# Patient Record
Sex: MALE | Race: OTHER | HISPANIC OR LATINO | URBAN - METROPOLITAN AREA
[De-identification: names, ages, dates, MRNs, and addresses within clinical notes are randomized per-mention and may not be internally consistent; named-entity substitution may affect disease eponyms.]

---

## 2019-05-18 ENCOUNTER — EMERGENCY (EMERGENCY)
Facility: HOSPITAL | Age: 74
LOS: 1 days | Discharge: HOME | End: 2019-05-18
Attending: EMERGENCY MEDICINE
Payer: SELF-PAY

## 2019-05-18 VITALS
SYSTOLIC BLOOD PRESSURE: 121 MMHG | DIASTOLIC BLOOD PRESSURE: 67 MMHG | HEIGHT: 60.24 IN | RESPIRATION RATE: 18 BRPM | HEART RATE: 60 BPM | TEMPERATURE: 98 F | OXYGEN SATURATION: 100 % | WEIGHT: 132.28 LBS

## 2019-05-18 VITALS
DIASTOLIC BLOOD PRESSURE: 65 MMHG | TEMPERATURE: 98 F | HEART RATE: 64 BPM | RESPIRATION RATE: 16 BRPM | SYSTOLIC BLOOD PRESSURE: 126 MMHG | OXYGEN SATURATION: 98 %

## 2019-05-18 PROCEDURE — 99283 EMERGENCY DEPT VISIT LOW MDM: CPT

## 2019-05-18 RX ORDER — DIPHENHYDRAMINE HCL 50 MG
25 CAPSULE ORAL ONCE
Refills: 0 | Status: COMPLETED | OUTPATIENT
Start: 2019-05-18 | End: 2019-05-18

## 2019-05-18 RX ADMIN — Medication 25 MILLIGRAM(S): at 10:52

## 2019-05-18 RX ADMIN — Medication 5 MILLIGRAM(S): at 10:52

## 2019-05-18 NOTE — ED PROVIDER NOTE - CHPI ED SYMPTOMS NEG
no bruising/no scaly patches on skin/no vomiting/no inflammation/no pain/no petechia/no chills/no fever

## 2019-05-18 NOTE — ED PROVIDER NOTE - CLINICAL SUMMARY MEDICAL DECISION MAKING FREE TEXT BOX
pt with most likely allergic reaction   will give benadryl and prednisone  pt did not want to wait to sign dc papers buts given verbal instrucitons and will follow up with pmd this week and return if any new or concerning symptoms

## 2019-05-18 NOTE — ED PROVIDER NOTE - OBJECTIVE STATEMENT
pt only speaks Greek  declined  phone  used family to interpret   pt with itchy rash for two days  used a new lotion and was in the sun  no sick contacts  just returned from [place]  no fever no cough no chills

## 2019-05-18 NOTE — ED ADULT NURSE NOTE - NSIMPLEMENTINTERV_GEN_ALL_ED
Implemented All Universal Safety Interventions:  [place] to call system. Call bell, personal items and telephone within reach. Instruct patient to call for assistance. Room bathroom lighting operational. Non-slip footwear when patient is off stretcher. Physically safe environment: no spills, clutter or unnecessary equipment. Stretcher in lowest position, wheels locked, appropriate side rails in place.

## 2019-05-22 ENCOUNTER — EMERGENCY (EMERGENCY)
Facility: HOSPITAL | Age: 74
LOS: 1 days | Discharge: HOME | End: 2019-05-22
Attending: EMERGENCY MEDICINE
Payer: SELF-PAY

## 2019-05-22 VITALS
RESPIRATION RATE: 16 BRPM | SYSTOLIC BLOOD PRESSURE: 163 MMHG | OXYGEN SATURATION: 98 % | TEMPERATURE: 98 F | WEIGHT: 134.92 LBS | HEART RATE: 63 BPM | DIASTOLIC BLOOD PRESSURE: 74 MMHG | HEIGHT: 62 IN

## 2019-05-22 PROBLEM — I10 ESSENTIAL (PRIMARY) HYPERTENSION: Chronic | Status: ACTIVE | Noted: 2019-05-18

## 2019-05-22 PROCEDURE — 99283 EMERGENCY DEPT VISIT LOW MDM: CPT

## 2019-05-22 RX ORDER — DIPHENHYDRAMINE HCL 50 MG
1 CAPSULE ORAL
Qty: 16 | Refills: 0
Start: 2019-05-22 | End: 2019-05-25

## 2019-05-22 RX ORDER — PERMETHRIN CREAM 5% W/W 50 MG/G
1 CREAM TOPICAL
Qty: 60 | Refills: 0
Start: 2019-05-22

## 2019-05-22 RX ORDER — DIPHENHYDRAMINE HCL 50 MG
25 CAPSULE ORAL ONCE
Refills: 0 | Status: COMPLETED | OUTPATIENT
Start: 2019-05-22 | End: 2019-05-22

## 2019-05-22 RX ADMIN — Medication 25 MILLIGRAM(S): at 11:47

## 2019-05-22 NOTE — ED ADULT NURSE NOTE - NSIMPLEMENTINTERV_GEN_ALL_ED
Implemented All Universal Safety Interventions:  [place] to call system. Call bell, personal items and telephone within reach. Instruct patient to call for assistance. Room bathroom lighting operational. Non-slip footwear when patient is off stretcher. Physically safe environment: no spills, clutter or unnecessary equipment. Stretcher in lowest position, wheels locked, appropriate side rails in place.

## 2019-05-22 NOTE — ED PROVIDER NOTE - OBJECTIVE STATEMENT
74 y/o M with PMHx of HTN presents to the ED with complaints of itchiness to the body. Patient was seen in the ED 4 days ago at which time was given Benadryl with improvement of symptoms. Patient states rash and itchiness is worsened at night. Patient son notes patient's wife passed away one month ago and is concerned for anxiety. Denies fever, pain or any other acute complaints. Patient is visiting from [place]. Refused , prefers for son to translate: 74 y/o M with PMHx of HTN presents to the ED with complaints of itchiness to the body. Patient was seen in the ED 4 days ago at which time was given Benadryl with improvement of symptoms. Patient states rash and itchiness is worsened at night. Patient son notes patient's wife passed away one month ago and is concerned for anxiety. Denies fever, pain or any other acute complaints. Patient is visiting from [place].

## 2019-05-22 NOTE — ED PROVIDER NOTE - PROGRESS NOTE DETAILS
Lower suspicion of scabies however symptoms much worse at night. Will give trial of Permethrin. Will also  give Benadryl and allergist follow up. Pt is well appearing walking with steady gait, stable for discharge and follow up without fail with medical doctor. I had a detailed discussion with the patient and/or guardian regarding the historical points, exam findings, and any diagnostic results supporting the discharge diagnosis. Pt educated on care and need for follow up. Strict return instructions and red flag signs and symptoms discussed with patient. Questions answered. Pt shows understanding of discharge information and agrees to follow.

## 2019-05-22 NOTE — ED PROVIDER NOTE - CLINICAL SUMMARY MEDICAL DECISION MAKING FREE TEXT BOX
72 y/o M presents with rash and itchiness. Low suspicion of scabies or infection, however given symptoms increased at night and patient poor historian, will give trial of Permethrin. Will give Benadryl and allergist referral.